# Patient Record
Sex: FEMALE | Race: BLACK OR AFRICAN AMERICAN | NOT HISPANIC OR LATINO | URBAN - METROPOLITAN AREA
[De-identification: names, ages, dates, MRNs, and addresses within clinical notes are randomized per-mention and may not be internally consistent; named-entity substitution may affect disease eponyms.]

---

## 2024-08-08 ENCOUNTER — EMERGENCY (EMERGENCY)
Facility: HOSPITAL | Age: 34
LOS: 1 days | Discharge: HOME | End: 2024-08-08
Attending: EMERGENCY MEDICINE
Payer: COMMERCIAL

## 2024-08-08 VITALS
TEMPERATURE: 98 F | HEART RATE: 60 BPM | DIASTOLIC BLOOD PRESSURE: 65 MMHG | RESPIRATION RATE: 18 BRPM | OXYGEN SATURATION: 96 % | SYSTOLIC BLOOD PRESSURE: 106 MMHG

## 2024-08-08 VITALS
HEART RATE: 77 BPM | SYSTOLIC BLOOD PRESSURE: 139 MMHG | RESPIRATION RATE: 16 BRPM | WEIGHT: 186.95 LBS | HEIGHT: 68 IN | DIASTOLIC BLOOD PRESSURE: 86 MMHG | OXYGEN SATURATION: 99 % | TEMPERATURE: 98 F

## 2024-08-08 PROCEDURE — 73080 X-RAY EXAM OF ELBOW: CPT

## 2024-08-08 PROCEDURE — 73080 X-RAY EXAM OF ELBOW: CPT | Mod: 26,RT

## 2024-08-08 PROCEDURE — 99284 EMERGENCY DEPT VISIT MOD MDM: CPT

## 2024-08-08 PROCEDURE — 99283 EMERGENCY DEPT VISIT LOW MDM: CPT | Mod: 25

## 2024-08-08 RX ORDER — LIDOCAINE 5% 5 G/100G
1 CREAM TOPICAL ONCE
Refills: 0 | Status: COMPLETED | OUTPATIENT
Start: 2024-08-08 | End: 2024-08-08

## 2024-08-08 RX ORDER — METHOCARBAMOL 500 MG
2 TABLET ORAL
Qty: 24 | Refills: 0
Start: 2024-08-08 | End: 2024-08-10

## 2024-08-08 RX ORDER — IBUPROFEN 200 MG
400 TABLET ORAL ONCE
Refills: 0 | Status: COMPLETED | OUTPATIENT
Start: 2024-08-08 | End: 2024-08-08

## 2024-08-08 RX ORDER — ACETAMINOPHEN 500 MG
975 TABLET ORAL ONCE
Refills: 0 | Status: COMPLETED | OUTPATIENT
Start: 2024-08-08 | End: 2024-08-08

## 2024-08-08 RX ORDER — METHOCARBAMOL 500 MG
3 TABLET ORAL
Qty: 27 | Refills: 0
Start: 2024-08-08 | End: 2024-08-10

## 2024-08-08 RX ADMIN — Medication 975 MILLIGRAM(S): at 22:46

## 2024-08-08 RX ADMIN — Medication 400 MILLIGRAM(S): at 22:46

## 2024-08-08 RX ADMIN — LIDOCAINE 5% 1 PATCH: 5 CREAM TOPICAL at 22:37

## 2024-08-08 RX ADMIN — Medication 400 MILLIGRAM(S): at 19:55

## 2024-08-08 RX ADMIN — Medication 975 MILLIGRAM(S): at 19:55

## 2024-08-08 NOTE — ED PROVIDER NOTE - NSFOLLOWUPINSTRUCTIONS_ED_ALL_ED_FT
YOU WERE SEEN IN THE ED FOR: right elbow pain, neck and back pain after a fall at work.    WHILE YOU WERE HERE, YOU HAD: x-ray of your right elbow. Your test results did not reveal any concerning abnormalities. You were given: Tylenol 975mg, Ibuprofen 400mg, Lidocaine patch.  YOU WERE PRESCRIBED: Robaxin  FOLLOW THE INSTRUCTIONS ON THE LABEL/CONTAINER    FOR PAIN, YOU MAY TAKE TYLENOL (ACETAMINOPHEN) AND/OR IBUPROFEN (Advil or Motrin). FOLLOW THE INSTRUCTIONS ON THE LABEL/CONTAINER.  DO NOT EXCEED 4000MG OF TYLENOL (ACETAMINOPHEN) IN A 24 HOUR PERIOD. DO NOT TAKE IBUPROFEN IF THERE IS ANY CHANCE YOU COULD BE PREGNANT.    PLEASE FOLLOW UP WITH YOUR PRIVATE PHYSICIAN WITHIN THE NEXT 3-5 DAYS. BRING COPIES OF YOUR RESULTS.    RETURN TO THE EMERGENCY DEPARTMENT IF YOU EXPERIENCE ANY NEW/CONCERNING/WORSENING SYMPTOMS SUCH AS BUT NOT LIMITED TO: chest pain, shortness of breath, severe abdominal pain or back pain, persistent vomiting, loss of urinary or bowel continence, difficulty urinating (changes in bowel or bladder control), numbness, weakness or tingling in extremities (arms or legs), severe neck pain, increasing pain in any area of your body, blood in your urine, stool, or vomit, severe headache, sudden vision loss or double vision, or any other concern.

## 2024-08-08 NOTE — ED PROVIDER NOTE - OBJECTIVE STATEMENT
34-year-old female no reported past medical history presents after a fall at work.  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  Denies head strike or LOC.  Was ambulatory after without difficulty.  Denies difficulty walking since the fall, saddle anesthesia, urinary retention or incontinence, extremity numbness, weakness.

## 2024-08-08 NOTE — ED PROVIDER NOTE - PROGRESS NOTE DETAILS
Xr nonactionable, pain improved, Will dc with follow up. Discussed plan and return precautions with patient who understands and agrees. All questions answered. - Carlos Banuelos PA-C

## 2024-08-08 NOTE — ED ADULT NURSE NOTE - OBJECTIVE STATEMENT
35 36 35 y/o F presents to the ED with complaints of fall. Pt reports slipping and falling backwards. Pt denies head strike, and LOC. Pt endorses upper medial back pain, and right elbow pain. Pt A&Ox3 gross neuro intact, no difficulty speaking in complete sentences, pulses x 4, edwards x4

## 2024-08-08 NOTE — ED PROVIDER NOTE - ATTENDING APP SHARED VISIT CONTRIBUTION OF CARE
Attending MD Munguia:   I personally have seen and examined this patient.  Physician assistant note reviewed and agree on plan of care and except where noted.  See below for details.     Seen in [facility]    34F with no reported contributory PMH/PSH/Meds, no known drug allergies presents to the ED s/p fall at work at [employer].  Reports was getting a patient's tray but slipped on wet floor, reports hit R elbow on edge of table and fell backwards now with back and neck pain.  Denies preceding dizziness, weakness, sensory changes.  Denies LOC. Denies numbness, weakness or tingling in extremities. Denies loss of urinary or bowel continence. Denies chest pain, shortness of breath, abdominal pain, nausea, vomiting, diarrhea, urinary complaints.     Exam:   General: NAD  HENT: head NCAT, airway patent  Eyes: anicteric, no conjunctival injection   Lungs: lungs CTAB with good inspiratory effort, no wheezing, no rhonchi, no rales  Cardiac: +S1S2, no obvious m/r/g  GI: abdomen soft with +BS, NT, ND  : no CVAT  MSK: ranging neck and extremities freely, FROM at RUE including at R elbow, +tenderness diffusely, no point tenderness, no midline spine tenderness, +muscle tightness to traps/upper back area, +paracervical and parathoracic tenderness, no ecchymosis, no breaks in skin  Neuro: moving all extremities spontaneously 5/5 strength, nonfocal, no saddle anesthesia  Psych: normal mood and affect     A/P: 34F with R elbow pain, reviewed QPA xray which is nonactionable, reports some improvement with analgesia already given to patient, patient drove to work so cannot give Robaxin as may make her drowsy.  Discussed with patient will Rx.  Stable for discharge. Follow up instructions given, importance of follow up emphasized, return to ED parameters reviewed and patient verbalized understanding.  All questions answered, all concerns addressed.

## 2024-08-08 NOTE — ED PROVIDER NOTE - MUSCULOSKELETAL, MLM
Spine appears normal no midline tenderness/deformities. Point tenderness to right elbow w/out obvious deformity. tenderness/spasticity to right upper trap/paraspinal muscles. 5/5 strength all extremities, FISCHER, Ambulatory with steady gait.

## 2024-08-08 NOTE — ED PROVIDER NOTE - RAPID ASSESSMENT
34-year-old female no reported past medical history presents after a fall at work.  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  Denies head strike or LOC.  Was ambulatory after without difficulty.  Denies difficulty walking since the fall, saddle anesthesia, urinary retention or incontinence, extremity numbness, weakness.    well appearing R elbow tender w/FROM    I, Carlos Banuelos PA-C saw patient as a rapid assessment initially in triage. The rest of care to be performed by the primary ED team. Receiving team will follow up on labs, analgesia, any clinical imaging, and perform reassessment and disposition of the patient as clinically indicated. All decisions regarding the progression of care will be made at their discretion.

## 2024-08-08 NOTE — ED PROVIDER NOTE - PATIENT PORTAL LINK FT
You can access the FollowMyHealth Patient Portal offered by [facility] by registering at the following website: [email or web address]. By joining [employer]’s FollowMyHealth portal, you will also be able to view your health information using other applications (apps) compatible with our system.